# Patient Record
Sex: FEMALE
[De-identification: names, ages, dates, MRNs, and addresses within clinical notes are randomized per-mention and may not be internally consistent; named-entity substitution may affect disease eponyms.]

---

## 2022-11-20 ENCOUNTER — HOSPITAL ENCOUNTER (OUTPATIENT)
Dept: HOSPITAL 97 - ER | Age: 67
Setting detail: OBSERVATION
LOS: 1 days | Discharge: HOME | End: 2022-11-21
Attending: HOSPITALIST | Admitting: HOSPITALIST
Payer: COMMERCIAL

## 2022-11-20 VITALS — BODY MASS INDEX: 34 KG/M2

## 2022-11-20 DIAGNOSIS — E78.5: ICD-10-CM

## 2022-11-20 DIAGNOSIS — Z20.822: ICD-10-CM

## 2022-11-20 DIAGNOSIS — E11.9: ICD-10-CM

## 2022-11-20 DIAGNOSIS — R07.89: Primary | ICD-10-CM

## 2022-11-20 DIAGNOSIS — E66.9: ICD-10-CM

## 2022-11-20 DIAGNOSIS — I10: ICD-10-CM

## 2022-11-20 LAB
BUN BLD-MCNC: 18 MG/DL (ref 7–18)
GLUCOSE SERPLBLD-MCNC: 127 MG/DL (ref 74–106)
HCT VFR BLD CALC: 45.3 % (ref 36–45)
HDLC SERPL-MCNC: 56 MG/DL (ref 40–60)
INR BLD: 0.96
LDLC SERPL CALC-MCNC: 76 MG/DL (ref ?–130)
LYMPHOCYTES # SPEC AUTO: 2 K/UL (ref 0.7–4.9)
MCV RBC: 85.5 FL (ref 80–100)
PMV BLD: 9.6 FL (ref 7.6–11.3)
POTASSIUM SERPL-SCNC: 2.9 MMOL/L (ref 3.5–5.1)
RBC # BLD: 5.3 M/UL (ref 3.86–4.86)
TROPONIN I SERPL HS-MCNC: 10.5 PG/ML (ref ?–58.9)
TROPONIN I SERPL HS-MCNC: 8.3 PG/ML (ref ?–58.9)

## 2022-11-20 PROCEDURE — 71045 X-RAY EXAM CHEST 1 VIEW: CPT

## 2022-11-20 PROCEDURE — 83880 ASSAY OF NATRIURETIC PEPTIDE: CPT

## 2022-11-20 PROCEDURE — 99285 EMERGENCY DEPT VISIT HI MDM: CPT

## 2022-11-20 PROCEDURE — 80048 BASIC METABOLIC PNL TOTAL CA: CPT

## 2022-11-20 PROCEDURE — 74175 CTA ABDOMEN W/CONTRAST: CPT

## 2022-11-20 PROCEDURE — 96374 THER/PROPH/DIAG INJ IV PUSH: CPT

## 2022-11-20 PROCEDURE — 87811 SARS-COV-2 COVID19 W/OPTIC: CPT

## 2022-11-20 PROCEDURE — 93005 ELECTROCARDIOGRAM TRACING: CPT

## 2022-11-20 PROCEDURE — 36415 COLL VENOUS BLD VENIPUNCTURE: CPT

## 2022-11-20 PROCEDURE — 85610 PROTHROMBIN TIME: CPT

## 2022-11-20 PROCEDURE — 96361 HYDRATE IV INFUSION ADD-ON: CPT

## 2022-11-20 PROCEDURE — 84484 ASSAY OF TROPONIN QUANT: CPT

## 2022-11-20 PROCEDURE — 85025 COMPLETE CBC W/AUTO DIFF WBC: CPT

## 2022-11-20 PROCEDURE — 80061 LIPID PANEL: CPT

## 2022-11-20 PROCEDURE — 82947 ASSAY GLUCOSE BLOOD QUANT: CPT

## 2022-11-20 PROCEDURE — 71275 CT ANGIOGRAPHY CHEST: CPT

## 2022-11-20 RX ADMIN — Medication SCH ML: at 20:07

## 2022-11-20 NOTE — EDPHYS
Physician Documentation                                                                           

 [facility]                                                                 

Name: Melia Fuentes                                                                               

Age: 67 yrs                                                                                       

Sex: Female                                                                                       

: 1955                                                                                   

MRN: [id number]                                                                                   

Arrival Date: 2022                                                                          

Time: 08:30                                                                                       

Account#: [id number]                                                                            

Bed 27                                                                                            

Private MD:                                                                                       

ED Physician Rayray Perez                                                                      

HPI:                                                                                              

                                                                                             

11:31 This 67 yrs old  Female presents to ER via Ambulatory with complaints of Chest  latasha 

      Pain, Neck Pain, >24Hrs Old, Dizziness.                                                     

11:31 The patient or guardian reports chest pain that is located primarily in the anterior    [facility] 

      chest wall. Onset: 15 day(s) ago. The pain does not radiate. Associated signs and           

      symptoms: The patient has no apparent associated signs or symptoms. The chest pain is       

      described as a pressure. Duration: The patient or guardian reports a single episode,        

      that is still ongoing, The patient or guardian reports multiple episodes, that have now     

      resolved. Severity of pain: At its worst the pain was mild in the emergency department      

      the pain is unchanged. The patient has not experienced similar symptoms in the past.        

                                                                                                  

Historical:                                                                                       

- Allergies:                                                                                      

08:50 No Known Allergies;                                                                     ss  

- Home Meds:                                                                                      

08:50 metformin 500 mg Oral tr24 1 tab once daily [Active]; amlodipine-benazepril 5-20 mg     ss  

      oral cap 1 cap once daily [Active]; chlorthalidone 25 mg Oral tab 1 tab once daily          

      [Active]; buspirone 10 mg Oral tab [Active]; atorvastatin 40 mg oral tab 1 tab once         

      daily [Active]; High cholesterol [Active];                                                  

- PMHx:                                                                                           

08:50 Hypertensive disorder; Anxiety;                                                         ss  

- PSHx:                                                                                           

08:50 None;                                                                                   ss  

                                                                                                  

- Immunization history:: Client reports receiving the 2nd dose of the Covid vaccine.              

- Social history:: Smoking status: Patient denies any tobacco usage or history of.                

- Family history:: not pertinent.                                                                 

                                                                                                  

                                                                                                  

ROS:                                                                                              

11:31 Constitutional: Negative for fever, chills, and weight loss, Eyes: Negative for injury, latasha 

      pain, redness, and discharge, ENT: Negative for injury, pain, and discharge, Neck:          

      Negative for injury, pain, and swelling, Respiratory: Negative for shortness of breath,     

      cough, wheezing, and pleuritic chest pain, Abdomen/GI: Negative for abdominal pain,         

      nausea, vomiting, diarrhea, and constipation, Back: Negative for injury and pain, :       

      Negative for injury, bleeding, discharge, and swelling, MS/Extremity: Negative for          

      injury and deformity, Skin: Negative for injury, rash, and discoloration, Neuro:            

      Negative for headache, weakness, numbness, tingling, and seizure, Psych: Negative for       

      depression, anxiety, suicide ideation, homicidal ideation, and hallucinations,              

      Allergy/Immunology: Negative for hives, rash, and allergies, Endocrine: Negative for        

      neck swelling, polydipsia, polyuria, polyphagia, and marked weight changes,                 

      Hematologic/Lymphatic: Negative for swollen nodes, abnormal bleeding, and unusual           

      bruising.                                                                                   

11:31 Cardiovascular: Positive for chest pain, of the chest.                                      

                                                                                                  

Exam:                                                                                             

11:31 Constitutional:  This is a well developed, well nourished patient who is awake, alert,  latasha 

      and in no acute distress. Head/Face:  Normocephalic, atraumatic. Eyes:  Pupils equal        

      round and reactive to light, extra-ocular motions intact.  Lids and lashes normal.          

      Conjunctiva and sclera are non-icteric and not injected.  Cornea within normal limits.      

      Periorbital areas with no swelling, redness, or edema. ENT:  Nares patent. No nasal         

      discharge, no septal abnormalities noted.  Tympanic membranes are normal and external       

      auditory canals are clear.  Oropharynx with no redness, swelling, or masses, exudates,      

      or evidence of obstruction, uvula midline.  Mucous membranes moist. Neck:  Trachea          

      midline, no thyromegaly or masses palpated, and no cervical lymphadenopathy.  Supple,       

      full range of motion without nuchal rigidity, or vertebral point tenderness.  No            

      Meningismus. Chest/axilla:  Normal chest wall appearance and motion.  Nontender with no     

      deformity.  No lesions are appreciated. Cardiovascular:  Regular rate and rhythm with a     

      normal S1 and S2.  No gallops, murmurs, or rubs.  Normal PMI, no JVD.  No pulse             

      deficits. Respiratory:  Lungs have equal breath sounds bilaterally, clear to                

      auscultation and percussion.  No rales, rhonchi or wheezes noted.  No increased work of     

      breathing, no retractions or nasal flaring. Abdomen/GI:  Soft, non-tender, with normal      

      bowel sounds.  No distension or tympany.  No guarding or rebound.  No evidence of           

      tenderness throughout. Back:  No spinal tenderness.  No costovertebral tenderness.          

      Full range of motion. Skin:  Warm, dry with normal turgor.  Normal color with no            

      rashes, no lesions, and no evidence of cellulitis. MS/ Extremity:  Pulses equal, no         

      cyanosis.  Neurovascular intact.  Full, normal range of motion. Neuro:  Awake and           

      alert, GCS 15, oriented to person, place, time, and situation.  Cranial nerves II-XII       

      grossly intact.  Motor strength 5/5 in all extremities.  Sensory grossly intact.            

      Cerebellar exam normal.  Normal gait. Psych:  Awake, alert, with orientation to person,     

      place and time.  Behavior, mood, and affect are within normal limits.                       

11:31 ECG was reviewed by the Attending Physician.                                                

                                                                                                  

Vital Signs:                                                                                      

08:47  / 81; Pulse 89; Resp 16; Temp 97.6(TE); Pulse Ox 99% on R/A; Weight 78.93 kg;    ss  

      Height 5 ft. 0 in. (152.40 cm); Pain 0/10;                                                  

11:00  / 56; Pulse 65; Resp 15; Pulse Ox 96% on R/A;                                    ph  

12:45  / 78; Pulse 77; Resp 16; Pulse Ox 97% on R/A;                                    eh3 

13:45  / 67; Pulse 73; Resp 19; Pulse Ox 100% on R/A;                                   eh3 

14:00  / 65; Pulse 76; Resp 19; Pulse Ox 100% on R/A;                                   eh3 

08:47 Body Mass Index 33.98 (78.93 kg, 152.40 cm)                                             ss  

                                                                                                  

MDM:                                                                                              

09:27 Patient medically screened.                                                             latasha 

10:22 Patient medically screened.                                                             latasha 

11:33 Differential diagnosis: abnormal EKG, acute myocardial infarction, acute pericarditis,  latasha 

      coronary artery disease chest wall pain, hiatal hernia, pancreatitis, pulmonary             

      embolus, stable angina, thoracic aortic disection, unstable angina. HEART Score:            

      History: Slightly Suspicious (0), ECG: Non specific repolarization disturbance / LBTB /     

      PM (1), Age: > or = 65 years (2), Risk Factors: > or = 3 Risk factors for                   

      atherosclerotic disease (2), [Hypercholesterolemia] [Hypertension] [+ Family HX]            

      Troponin: < or = 1 x Normal Limit (0). The patient was given aspirin in the Emergency       

      Department. The patient's deep vein thrombosis risk score was calculated as follows:        

      Total Score: 0. This patient was found to be at low risk for a deep vein thrombosis by      

      using the Well's assessment criteria. The patient's pulmonary embolism risk score was       

      calculated as follows: Total Score: 0-2 points. This patient was found to be at low         

      risk for a pulmonary embolism by using the Well's assessment criteria. JOHANA Risk Score:     

      1 - patient's age is greater or equal to 65 years, 1 - Three or more CAD risk factors,      

      TOTAL SCORE = 2. Data reviewed: vital signs, nurses notes, lab test result(s), EKG,         

      radiologic studies, CT scan, plain films. Data interpreted: Cardiac monitor: rate is 65     

      beats/min, rhythm is regular, Pulse oximetry: on room air is 96 %. Test interpretation:     

      by ED physician or midlevel provider: ECG, plain radiologic studies. Counseling: I had      

      a detailed discussion with the patient and/or guardian regarding: the historical            

      points, exam findings, and any diagnostic results supporting the discharge/admit            

      diagnosis, lab results, radiology results, the need for outpatient follow up, the need      

      for further work-up and treatment in the hospital.                                          

                                                                                                  

                                                                                             

09:01 Order name: Basic Metabolic Panel; Complete Time: 10:56                                   

                                                                                             

09:01 Order name: CBC with Diff; Complete Time: 10:32                                           

                                                                                             

09:01 Order name: Troponin HS; Complete Time: 10:56                                             

                                                                                             

09:29 Order name: PT-INR; Complete Time: 10:32                                                [facility] 

                                                                                             

09:29 Order name: BNP; Complete Time: 10:32                                                   [facility] 

                                                                                             

09:29 Order name: SARS RAPID; Complete Time: 10:32                                            [facility] 

                                                                                             

09:01 Order name: XRAY Chest (1 view); Complete Time: 12:00                                     

                                                                                             

09:29 Order name: CT Aorta for Dissection; Complete Time: 12:00                               [facility] 

                                                                                             

17:23 Order name: Troponin High Sensitivity                                                   EDMS

                                                                                             

17:23 Order name: Lipid Profile                                                               EDMS

                                                                                             

20:50 Order name: Troponin High Sensitivity                                                   EDMS

                                                                                             

03:47 Order name: CBC with Automated Diff                                                     EDMS

                                                                                             

04:04 Order name: Basic Metabolic Panel                                                       EDMS

                                                                                             

08:53 Order name: Glucose, Ancillary Testing                                                  EDMS

                                                                                             

09:01 Order name: EKG; Complete Time: 09:02                                                     

                                                                                             

09:01 Order name: Cardiac monitoring; Complete Time: 10:                                    ss  

                                                                                             

09: Order name: EKG - Nurse/Tech; Complete Time: :                                      ss  

09: Order name: IV Saline Lock; Complete Time: 10:                                        ss  

09: Order name: Labs collected and sent; Complete Time: 10:                               ss  

09: Order name: O2 Per Protocol; Complete Time: 10:                                       ss  

09: Order name: O2 Sat Monitoring; Complete Time: 10:                                     ss  

                                                                                                  

EC:31 Rate is 50 beats/min. Rhythm is regular. QRS Axis is Normal. DE interval is normal. QRS latasha 

      interval is normal. QT interval is normal. No Q waves. T waves are Normal. ST Segment       

      is depressed in leads II, III, aVF, V3, V4, V5, V6. Clinical impression: NSR w/             

      Non-specific ST/T Changes and No evidence of ischemia. Interpreted by me. Reviewed by       

      me.                                                                                         

                                                                                                  

Administered Medications:                                                                         

12:00 Not Given (Duplicate Order): NS 0.9% 1000 ml IV at 125 ml/hr continuous                 latasha 

12:08 Drug: Potassium Effervescent Tablet 50 mEq Route: PO;                                   ph  

12:10 Follow up: Response: No adverse reaction                                                ph  

12:09 Drug: Aspirin Chewable Tablet 162 mg Route: PO;                                         ph  

12:09 Follow up: Response: No adverse reaction                                                ph  

12:09 Drug: Pepcid (famotidine) 20 mg Route: IVP; Site: left antecubital;                     ph  

12:09 Follow up: Response: No adverse reaction                                                ph  

12:22 Drug: NS 0.9% with KCl 20 mEq/L 1000 ml Route: IV; Rate: 100 ml/hr; Site: left          eh3 

      antecubital;                                                                                

20:23 Follow up: IV Status: Infusion continued upon admission; IV Intake: 400ml               eh3 

                                                                                                  

                                                                                                  

Disposition Summary:                                                                              

22 11:56                                                                                    

Hospitalization Ordered                                                                           

      Hospitalization Status: Observation                                                     latasha 

      Provider: Vinay Wright cha 

      Condition: Stable                                                                       latasha 

      Problem: new                                                                            latasha 

      Symptoms: have improved                                                                 latasha 

      Bed/Room Type: Standard                                                                 latasha 

      Location: [facility] ER HOLD(22 19:41)                                                  mw  

      Room Assignment: ERHOLD-(22 19:41)                                                mw  

      Diagnosis                                                                                   

        - Chest pain, unspecified                                                             latasha 

        - Essential (primary) hypertension                                                    latasha 

        - Hypokalemia                                                                         latasha 

      Forms:                                                                                      

        - Medication Reconciliation Form                                                      latasha 

        - SBAR form                                                                           latasha 

Signatures:                                                                                       

Dispatcher [employer]                           EDKassandra Gu RN                        RN   Rayray Lopez MD MD cha Smirch, Shelby RN                      RN   Adalgisa Victor RN RN                                                      

Sheeba Chicas, RN                          RN   eh3                                                  

                                                                                                  

Corrections: (The following items were deleted from the chart)                                    

08:53 08:50 Allergies: Aspirin; [facility]  

19:41 11:56 Telemetry/MedSurg (observation) [facility]  

19:41 11:56 [facility]  

                                                                                                  

**************************************************************************************************

## 2022-11-20 NOTE — P.HP
Certification for Inpatient


Patient admitted to: Observation


With expected LOS: <2 Midnights


Practitioner: I am a practitioner with admitting privileges, knowledge of 

patient current condition, hospital course, and medical plan of care.


Services: Services provided to patient in accordance with Admission requirements

found in Title 42 Section 412.3 of the Code of Federal Regulations





Patient History


Date of Service: 11/20/22


Reason for admission: Chest pressure and high blood pressure


History of Present Illness: 


67-year-old Khmer-speaking woman with a history of hypertension, type 2 

diabetes on metformin, hyperlipidemia presented to the emergency department due 

to uncontrolled blood pressure.  According to the patient her systolic blood 

pressure has been somewhat around 170s to 200 over the last 2 weeks.  She stated

she has been experiencing chest pressure last few days.  She denied any chest 

pain or cough or shortness of breath.  Her systolic blood pressure was in the 

150s during my examination in the ED. initial troponin is negative.  EKG with 

nonspecific T wave change, no old EKG to compare.  ED provider wishes to 

hospitalize patient for ACS rule out.





Home Medications: 








Amlodipine Besylate/Benazepril [Amlodipine-Benazepril 5-20 mg] 1 each PO DAILY 

11/20/22 


Metformin HCl [Glucophage] 500 mg PO BIDWM 11/20/22 








- Past Medical/Surgical History


-: Hypertension


-: Diabetes mellitus type


-: Hyperlipidemia


-: Breast lump removal





- Family History


  ** Mother


-: Diabetes





- Social History


Smoking Status: Former smoker


Alcohol use: No


CD- Drugs: No


Place of Residence: Home





Review of Systems


Other: 


Except as documented, all other systems reviewed and negative.








Physical Examination





- Physical Exam


General: Alert, In no apparent distress, Oriented x3


HEENT: Normocephalic, PERRLA, Mucous membr. moist/pink, Sclerae nonicteric


Neck: Supple, JVD not distended


Respiratory: Clear to auscultation bilaterally, Normal air movement


Cardiovascular: No edema, Regular rate/rhythm, Normal S1 S2, No murmurs


Capillary refill: <2 Seconds


Gastrointestinal: Normal bowel sounds, Soft and benign, Non-distended, No 

tenderness


Musculoskeletal: No swelling, No tenderness


Integumentary: No rashes, No cyanosis


Neurological: Normal speech, Normal strength at 5/5 x4 extr, Cranial nerves 3-12

 intact


Lymphatics: No axilla or inguinal lymphadenopathy





- Studies


Laboratory Data (last 24 hrs)





11/20/22 09:36: PT 10.6, INR 0.96


11/20/22 09:36: WBC 7.60, Hgb 14.8, Hct 45.3 H, Plt Count 347


11/20/22 09:36: Sodium 136, Potassium 2.9 L*, BUN 18, Creatinine 0.73, Glucose 

127 H








Assessment and Plan





- Problems (Diagnosis)


(1) Chest pressure


Current Visit: Yes   Status: Acute   





(2) Uncontrolled hypertension


Current Visit: Yes   Status: Acute   





(3) Diabetes mellitus type 2 in obese


Current Visit: Yes   Status: Acute   





(4) Hyperlipidemia


Current Visit: Yes   Status: Acute   





- Plan


Place patient on observation.


Trend troponin.


Resume home antihypertensive.


IV hydralazine as needed for BP spikes.


Titrate home BP medications for blood pressure control.


Obtain echocardiogram.


Hold metformin and manage blood sugar with insulin sliding scale and Accu-Chek.


Check lipid profile.


Further management pending troponin result.





- Advance Directives


Does patient have a Living Will: No


Does patient have a Durable POA for Healthcare: No

## 2022-11-20 NOTE — RAD REPORT
EXAM DESCRIPTION:  RAD - Chest Single View - 11/20/2022 9:51 am

 

CLINICAL HISTORY:  chest pa

Chest pain.

 

COMPARISON:  No comparisons

 

FINDINGS:  Portable technique limits examination quality.

 

Mild scarring is seen in the right apex. The lungs are otherwise clear. The heart is normal in size. 
No displaced fractures.

 

IMPRESSION:  No acute intrathoracic process suspected.

## 2022-11-20 NOTE — RAD REPORT
EXAM DESCRIPTION:  CT - Angio  Aorta For Dissection - 11/20/2022 11:36 am

 

CLINICAL HISTORY:  Chest pain radiating to the back.

dissection

 

COMPARISON:  Head C  Spine Cap W Con dated 5/29/2016

 

TECHNIQUE:  CT angiography of the aorta was performed with MIPs.

 

All CT scans are performed using dose optimization technique as appropriate and may include automated
 exposure control or mA/KV adjustment according to patient size.

 

FINDINGS:  A left aortic arch is present with normal branching pattern of the great vessels.No acute 
aortic finding is seen such as aneurysm, penetrating ulcer or dissection.  The celiac axis, SMA, ANAM 
and renal arteries are patent.

 

No evidence of pulmonary embolism.

 

Irregular scarring is present in the posterior right upper lobe. The lungs are otherwise clear.

 

Mild fatty liver. No focal liver lesion or biliary dilatation.The spleen, pancreas, adrenal glands an
d kidneys are within normal limits for arterial phase imaging.

 

No bowel obstruction, free fluid or abscess.No pathologic enlarged lymphadenopathy identified.

 

Moderate lumbar degenerative changes.

 

 

IMPRESSION:  No acute aortic finding is demonstrated.

 

Irregular scarring posterior right upper lobe.

## 2022-11-20 NOTE — ER
Nurse's Notes                                                                                     

 [facility]                                                                 

Name: Melia Fuentes                                                                               

Age: 67 yrs                                                                                       

Sex: Female                                                                                       

: 1955                                                                                   

MRN: [id number]                                                                                   

Arrival Date: 2022                                                                          

Time: 08:30                                                                                       

Account#: [id number]                                                                            

Bed 27                                                                                            

Private MD:                                                                                       

Diagnosis: Chest pain, unspecified;Essential (primary) hypertension;Hypokalemia                   

                                                                                                  

Presentation:                                                                                     

                                                                                             

08:47 Chief complaint: Patient states: chest pain, neck pain and dizziness that began 15 days ss  

      ago. Pt reports that she saw her doctor on Wednesday, however they did not do any           

      testing and she is not feeling any better. Coronavirus screen: Client denies travel out     

      of the [place] in the last 14 days. Ebola Screen: Patient denies exposure to infectious        

      person. Patient denies travel to an Ebola-affected area in the 21 days before illness       

      onset. Initial Sepsis Screen: Does the patient meet any 2 criteria? No. Patient's           

      initial sepsis screen is negative. Does the patient have a suspected source of              

      infection? No. Patient's initial sepsis screen is negative. Risk Assessment: Do you         

      want to hurt yourself or someone else? Patient reports no desire to harm self or            

      others. Onset of symptoms was 2022.                                            

08:47 Method Of Arrival: Ambulatory                                                           ss  

08:47 Acuity: JOSE 3                                                                           ss  

                                                                                                  

Historical:                                                                                       

- Allergies:                                                                                      

08:50 No Known Allergies;                                                                     ss  

- Home Meds:                                                                                      

08:50 metformin 500 mg Oral tr24 1 tab once daily [Active]; amlodipine-benazepril 5-20 mg     ss  

      oral cap 1 cap once daily [Active]; chlorthalidone 25 mg Oral tab 1 tab once daily          

      [Active]; buspirone 10 mg Oral tab [Active]; atorvastatin 40 mg oral tab 1 tab once         

      daily [Active]; High cholesterol [Active];                                                  

- PMHx:                                                                                           

08:50 Hypertensive disorder; Anxiety;                                                         ss  

- PSHx:                                                                                           

08:50 None;                                                                                   ss  

                                                                                                  

- Immunization history:: Client reports receiving the 2nd dose of the Covid vaccine.              

- Social history:: Smoking status: Patient denies any tobacco usage or history of.                

- Family history:: not pertinent.                                                                 

                                                                                                  

                                                                                                  

Screenin:01 Abuse screen: Denies threats or abuse. Denies injuries from another. Nutritional        ph  

      screening: No deficits noted. Tuberculosis screening: No symptoms or risk factors           

      identified. Fall Risk None identified.                                                      

                                                                                                  

Assessment:                                                                                       

12:18 General: Appears in no apparent distress. comfortable, well groomed, Behavior is calm,  ph  

      cooperative, appropriate for age. Pain: Complains of pain in chest Pain radiates to         

      neck Pain began 2-3 days ago. Neuro: Level of Consciousness is awake, alert, obeys          

      commands, Oriented to person, place, time, situation. Cardiovascular: Reports chest         

      pain, Denies nausea, shortness of breath, Capillary refill < 3 seconds in bilateral         

      fingers Patient's skin is warm and dry. Rhythm is sinus rhythm. Respiratory: Airway is      

      patent Respiratory effort is even, unlabored. GI: Abdomen is round non-distended,           

      Patient currently denies abdominal pain. Derm: Skin is healthy with good turgor, Skin       

      is pink, warm \T\ dry. Musculoskeletal: Circulation, motion, and sensation intact. Range    

      of motion: intact in all extremities.                                                       

12:45 Reassessment: Patient appears in no apparent distress at this time. Patient and/or      3 

      family updated on plan of care and expected duration. Pain level reassessed. Patient is     

      alert, oriented x 3, equal unlabored respirations, skin warm/dry/pink.                      

13:45 Reassessment: Patient appears in no apparent distress at this time. Patient and/or      3 

      family updated on plan of care and expected duration. Pain level reassessed. Patient is     

      alert, oriented x 3, equal unlabored respirations, skin warm/dry/pink.                      

14:45 Reassessment: Patient appears in no apparent distress at this time. Patient and/or      eh3 

      family updated on plan of care and expected duration. Pain level reassessed. Patient is     

      alert, oriented x 3, equal unlabored respirations, skin warm/dry/pink.                      

                                                                                                  

Vital Signs:                                                                                      

08:47  / 81; Pulse 89; Resp 16; Temp 97.6(TE); Pulse Ox 99% on R/A; Weight 78.93 kg;    ss  

      Height 5 ft. 0 in. (152.40 cm); Pain 0/10;                                                  

11:00  / 56; Pulse 65; Resp 15; Pulse Ox 96% on R/A;                                    ph  

12:45  / 78; Pulse 77; Resp 16; Pulse Ox 97% on R/A;                                    eh3 

13:45  / 67; Pulse 73; Resp 19; Pulse Ox 100% on R/A;                                   eh3 

14:00  / 65; Pulse 76; Resp 19; Pulse Ox 100% on R/A;                                   eh3 

08:47 Body Mass Index 33.98 (78.93 kg, 152.40 cm)                                             ss  

                                                                                                  

Vitals:                                                                                           

11:00 Cardiac Rhythm Assessment Sinus rhythm.                                                 ph  

                                                                                                  

ED Course:                                                                                        

08:30 Patient arrived in ED.                                                                  rg4 

08:50 Triage completed.                                                                       ss  

08:50 Arm band placed on right wrist.                                                         ss  

09:27 Rayray Perez MD is Attending Physician.                                             [facility] 

09:38 Inserted saline lock: 20 gauge in right antecubital area, using aseptic technique.      ss  

      Blood collected. Patient maintains SpO2 saturation greater than 95% on room air.            

09:53 XRAY Chest (1 view) In Process Unspecified.                                             EDMS

10:07 Lillian Arnold, REGIS is Primary Nurse.                                                    ss  

10:08 COVID swab sent to lab.                                                                 tm3 

11:01 Patient has correct armband on for positive identification. Bed in low position. Call   ph  

      light in reach. Side rails up X 1. Client placed on continuous cardiac and pulse            

      oximetry monitoring. NIBP monitoring applied.                                               

11:38 CT Aorta for Dissection In Process Unspecified.                                         EDMS

11:55 Vinay Wright is Hospitalizing Provider.                                               [place] 

12:45 Door closed. Noise minimized. Warm blanket given. Diet: Patient given snack. Patient    eh3 

      given juice. Patient given water. Tolerated well.                                           

12:54 Primary Nurse role handed off by Lillian Arnold RN                                     [facility] 

12:54 Sheeba Chicas, RN is Primary Nurse.                                                        3 

15:36 No provider procedures requiring assistance completed. Patient admitted, IV remains in  eh3 

      place.                                                                                      

                                                                                                  

Administered Medications:                                                                         

12:00 Not Given (Duplicate Order): NS 0.9% 1000 ml IV at 125 ml/hr continuous                 latasha 

12:08 Drug: Potassium Effervescent Tablet 50 mEq Route: PO;                                   ph  

12:10 Follow up: Response: No adverse reaction                                                ph  

12:09 Drug: Aspirin Chewable Tablet 162 mg Route: PO;                                         ph  

12:09 Follow up: Response: No adverse reaction                                                ph  

12:09 Drug: Pepcid (famotidine) 20 mg Route: IVP; Site: left antecubital;                     ph  

12:09 Follow up: Response: No adverse reaction                                                ph  

12:22 Drug: NS 0.9% with KCl 20 mEq/L 1000 ml Route: IV; Rate: 100 ml/hr; Site: left          eh3 

      antecubital;                                                                                

20:23 Follow up: IV Status: Infusion continued upon admission; IV Intake: 400ml               3 

                                                                                                  

                                                                                                  

Medication:                                                                                       

11:01 VIS not applicable for this client.                                                     ph  

                                                                                                  

Intake:                                                                                           

20:23 IV: 400ml; Total: 400ml.                                                                3 

                                                                                                  

Outcome:                                                                                          

11:56 Decision to Hospitalize by Provider.                                                    [facility] 

15:37 Admitted to ER Hold.  Please see [facility] for further documentation.                    [facility] 

15:37 Condition: stable                                                                           

15:37 Instructed on the need for admit.                                                           

                                                                                             

10:34 Patient left the ED.                                                                    tw2 

                                                                                                  

Signatures:                                                                                       

Dispatcher [employer]                           EDMS                                                 

Israel Urena                                tm3                                                  

Rayray Perez MD MD cha Smirch, Shelby, RN                      RN   Adalgisa Victor RN                      [facility]                                                      

Radha Pringle RN                          RN   2                                                  

Ana Gallo                                 [facility]                                                  

Sheeba Chicas, REGIS                          RN   3                                                  

                                                                                                  

Corrections: (The following items were deleted from the chart)                                    

                                                                                             

08:53 08:50 Allergies: Aspirin; [facility]  

                                                                                                  

**************************************************************************************************

## 2022-11-21 VITALS — TEMPERATURE: 98.9 F | SYSTOLIC BLOOD PRESSURE: 151 MMHG | DIASTOLIC BLOOD PRESSURE: 59 MMHG

## 2022-11-21 VITALS — OXYGEN SATURATION: 100 %

## 2022-11-21 LAB
BUN BLD-MCNC: 15 MG/DL (ref 7–18)
GLUCOSE SERPLBLD-MCNC: 123 MG/DL (ref 74–106)
HCT VFR BLD CALC: 42.2 % (ref 36–45)
LYMPHOCYTES # SPEC AUTO: 3.6 K/UL (ref 0.7–4.9)
MCV RBC: 85.5 FL (ref 80–100)
PMV BLD: 9.6 FL (ref 7.6–11.3)
POTASSIUM SERPL-SCNC: 3.2 MMOL/L (ref 3.5–5.1)
RBC # BLD: 4.94 M/UL (ref 3.86–4.86)

## 2022-11-21 RX ADMIN — Medication SCH: at 09:00

## 2022-11-21 NOTE — EKG
Test Date:    2022-11-20               Test Time:    08:58:36

Technician:   ROE                                     

                                                     

MEASUREMENT RESULTS:                                       

Intervals:                                           

Rate:         80                                     

WA:           164                                    

QRSD:         100                                    

QT:           392                                    

QTc:          452                                    

Axis:                                                

P:            74                                     

WA:           164                                    

QRS:          60                                     

T:            9                                      

                                                     

INTERPRETIVE STATEMENTS:                                       

                                                     

Normal sinus rhythm

Right atrial enlargement

Cannot rule out Inferior infarct, age undetermined

Abnormal ECG

No previous ECG available for comparison



Electronically Signed On 11-21-22 15:28:43 CST by Sage Bullard

## 2022-11-21 NOTE — P.DS
Admission Date: 11/20/22


Discharge Date: 11/21/22


Disposition: ROUTINE DISCHARGE


Discharge Condition: FAIR


Reason for Admission: Chest pressure and high blood pressure





- Problems


(1) Chest pressure


Current Visit: Yes   Status: Acute   





(2) Uncontrolled hypertension


Current Visit: Yes   Status: Acute   





(3) Diabetes mellitus type 2 in obese


Current Visit: Yes   Status: Acute   





(4) Hyperlipidemia


Current Visit: Yes   Status: Acute   


Brief History of Present Illness: 


67-year-old Romansh-speaking woman with a history of hypertension, type 2 

diabetes on metformin, hyperlipidemia presented to the emergency department due 

to uncontrolled blood pressure.  According to the patient her systolic blood 

pressure has been somewhat around 170s to 200 over the last 2 weeks.  She stated

she has been experiencing chest pressure last few days.  She denied any chest 

pain or cough or shortness of breath.  Her systolic blood pressure was in the 

150s during my examination in the ED. initial troponin is negative.  EKG with 

nonspecific T wave change, no old EKG to compare.  Patient placed on observation

for ACS rule out.


Hospital Course: 


Troponin trended negative.  Patient was asymptomatic during the hospital stay.  

ACS ruled out.  Her systolic blood pressure has been ranging 130s to 150.  

Patient home medications resumed during the hospital stay.  Patient currently 

asymptomatic, ACS rule out and deemed stable for discharge.  She is informed to 

follow-up with Dr. Bush for further cardiac work-up which may include 

echocardiogram and stress test.





Vital Signs/Physical Exam: 














Temp Pulse Resp BP Pulse Ox


 


 98.1 F   76   18   132/61   95 


 


 11/21/22 04:00  11/21/22 04:00  11/21/22 04:00  11/21/22 04:00  11/21/22 04:00








General: Alert, In no apparent distress, Oriented x3


HEENT: Mucous membr. moist/pink


Neck: JVD not distended


Respiratory: Clear to auscultation bilaterally, Normal air movement


Cardiovascular: No edema, Regular rate/rhythm, Normal S1 S2


Gastrointestinal: Normal bowel sounds, Soft and benign, Non-distended, No 

tenderness


Musculoskeletal: No tenderness


Integumentary: No rashes


Neurological: Normal strength at 5/5 x4 extr


Laboratory Data at Discharge: 














WBC  10.00 K/uL (4.3-10.9)   11/21/22  02:51    


 


Hgb  13.9 g/dL (12.0-15.0)   11/21/22  02:51    


 


Hct  42.2 % (36.0-45.0)   11/21/22  02:51    


 


Plt Count  341 K/uL (152-406)   11/21/22  02:51    


 


PT  10.6 SECONDS (9.5-12.5)   11/20/22  09:36    


 


INR  0.96   11/20/22  09:36    


 


Sodium  137 mmol/L (136-145)   11/21/22  02:51    


 


Potassium  3.2 mmol/L (3.5-5.1)  L  11/21/22  02:51    


 


BUN  15 mg/dL (7-18)   11/21/22  02:51    


 


Creatinine  0.69 mg/dL (0.55-1.3)   11/21/22  02:51    


 


Glucose  123 mg/dL ()  H  11/21/22  02:51    


 


Triglycerides  216 mg/dL (<150)  H  11/20/22  16:11    


 


Cholesterol  175 mg/dL (<200)   11/20/22  16:11    


 


HDL Cholesterol  56 mg/dL (40-60)   11/20/22  16:11    


 


Cholesterol/HDL Ratio  3.13   11/20/22  16:11    








Home Medications: 








Amlodipine Besylate/Benazepril [Amlodipine-Benazepril 5-20 mg] 1 each PO DAILY 

11/20/22 


Metformin HCl [Glucophage*] 500 mg PO BIDWM 11/20/22 


Aspirin [Aspirin EC 81 MG] 81 mg PO DAILY #30 tab 11/21/22 





New Medications: 


Aspirin [Aspirin EC 81 MG] 81 mg PO DAILY #30 tab


Physician Discharge Instructions: 





PROBLEM: (list out Acute Problems for the Current visit)


Chest Pressure 





GOAL: Clear understanding of disease process





INSTRUCTIONS:


DC IV DC Home


F/U with PCP in 1 to 2 weeks


F/U with cardiologist (Dr. Bush) in 1 week for stress test 








Diet: ADA





Activity: Ad dong





Follow up with a Cardiologist :





SCOTT BUSH MD


[address] [place] [id number] [phone]   


Fax [phone]


Diet: ADA


Activity: Ad dong


Followup: 


NONE,NONE [Primary Care Provider] - 


Scott Bush MD [ACTIVE - CAN ADMIT] - 1 Week